# Patient Record
Sex: MALE | Race: WHITE | NOT HISPANIC OR LATINO | Employment: FULL TIME | URBAN - METROPOLITAN AREA
[De-identification: names, ages, dates, MRNs, and addresses within clinical notes are randomized per-mention and may not be internally consistent; named-entity substitution may affect disease eponyms.]

---

## 2021-09-21 ENCOUNTER — OFFICE VISIT (OUTPATIENT)
Dept: INTERNAL MEDICINE | Facility: CLINIC | Age: 26
End: 2021-09-21
Payer: COMMERCIAL

## 2021-09-21 VITALS
WEIGHT: 195 LBS | HEIGHT: 71 IN | SYSTOLIC BLOOD PRESSURE: 146 MMHG | OXYGEN SATURATION: 100 % | DIASTOLIC BLOOD PRESSURE: 68 MMHG | BODY MASS INDEX: 27.3 KG/M2 | HEART RATE: 68 BPM | TEMPERATURE: 97.2 F

## 2021-09-21 DIAGNOSIS — R01.1 HEART MURMUR: ICD-10-CM

## 2021-09-21 DIAGNOSIS — Z13.6 CARDIOVASCULAR SCREENING; LDL GOAL LESS THAN 130: ICD-10-CM

## 2021-09-21 DIAGNOSIS — R03.0 ELEVATED BLOOD PRESSURE READING WITHOUT DIAGNOSIS OF HYPERTENSION: ICD-10-CM

## 2021-09-21 DIAGNOSIS — Z00.00 ROUTINE GENERAL MEDICAL EXAMINATION AT A HEALTH CARE FACILITY: Primary | ICD-10-CM

## 2021-09-21 DIAGNOSIS — L65.9 HAIR LOSS: ICD-10-CM

## 2021-09-21 LAB
ANION GAP SERPL CALCULATED.3IONS-SCNC: 6 MMOL/L (ref 3–14)
BUN SERPL-MCNC: 16 MG/DL (ref 7–30)
CALCIUM SERPL-MCNC: 8.5 MG/DL (ref 8.5–10.1)
CHLORIDE BLD-SCNC: 105 MMOL/L (ref 94–109)
CHOLEST SERPL-MCNC: 180 MG/DL
CO2 SERPL-SCNC: 28 MMOL/L (ref 20–32)
CREAT SERPL-MCNC: 1.33 MG/DL (ref 0.66–1.25)
ERYTHROCYTE [DISTWIDTH] IN BLOOD BY AUTOMATED COUNT: 17.6 % (ref 10–15)
FASTING STATUS PATIENT QL REPORTED: YES
GFR SERPL CREATININE-BSD FRML MDRD: 74 ML/MIN/1.73M2
GLUCOSE BLD-MCNC: 101 MG/DL (ref 70–99)
HCT VFR BLD AUTO: 43.7 % (ref 40–53)
HDLC SERPL-MCNC: 42 MG/DL
HGB BLD-MCNC: 13.3 G/DL (ref 13.3–17.7)
LDLC SERPL CALC-MCNC: 107 MG/DL
MCH RBC QN AUTO: 19.4 PG (ref 26.5–33)
MCHC RBC AUTO-ENTMCNC: 30.4 G/DL (ref 31.5–36.5)
MCV RBC AUTO: 64 FL (ref 78–100)
NONHDLC SERPL-MCNC: 138 MG/DL
PLAT MORPH BLD: NORMAL
PLATELET # BLD AUTO: 178 10E3/UL (ref 150–450)
POTASSIUM BLD-SCNC: 3.8 MMOL/L (ref 3.4–5.3)
RBC # BLD AUTO: 6.84 10E6/UL (ref 4.4–5.9)
RBC MORPH BLD: NORMAL
SODIUM SERPL-SCNC: 139 MMOL/L (ref 133–144)
TRIGL SERPL-MCNC: 154 MG/DL
WBC # BLD AUTO: 5.7 10E3/UL (ref 4–11)

## 2021-09-21 PROCEDURE — 36415 COLL VENOUS BLD VENIPUNCTURE: CPT | Performed by: INTERNAL MEDICINE

## 2021-09-21 PROCEDURE — 99385 PREV VISIT NEW AGE 18-39: CPT | Performed by: INTERNAL MEDICINE

## 2021-09-21 PROCEDURE — 85027 COMPLETE CBC AUTOMATED: CPT | Performed by: INTERNAL MEDICINE

## 2021-09-21 PROCEDURE — 80061 LIPID PANEL: CPT | Performed by: INTERNAL MEDICINE

## 2021-09-21 PROCEDURE — 80048 BASIC METABOLIC PNL TOTAL CA: CPT | Performed by: INTERNAL MEDICINE

## 2021-09-21 RX ORDER — CETIRIZINE HYDROCHLORIDE 10 MG/1
10 TABLET ORAL DAILY
COMMUNITY

## 2021-09-21 RX ORDER — FINASTERIDE 5 MG/1
TABLET, FILM COATED ORAL
Qty: 30 TABLET | Refills: 2 | Status: SHIPPED | OUTPATIENT
Start: 2021-09-21 | End: 2022-10-12

## 2021-09-21 RX ORDER — FINASTERIDE 1 MG/1
1 TABLET, FILM COATED ORAL DAILY
COMMUNITY
End: 2021-09-21 | Stop reason: ALTCHOICE

## 2021-09-21 RX ORDER — FLUTICASONE PROPIONATE 50 MCG
1 SPRAY, SUSPENSION (ML) NASAL DAILY
COMMUNITY

## 2021-09-21 ASSESSMENT — ENCOUNTER SYMPTOMS
JOINT SWELLING: 0
EYE PAIN: 0
HEMATOCHEZIA: 0
NAUSEA: 0
HEMATURIA: 0
WEAKNESS: 0
DIZZINESS: 0
FEVER: 0
HEARTBURN: 0
SHORTNESS OF BREATH: 0
COUGH: 0
DIARRHEA: 0
SORE THROAT: 0
CONSTIPATION: 0
DYSURIA: 0
ARTHRALGIAS: 0
FREQUENCY: 0
HEADACHES: 0
NERVOUS/ANXIOUS: 0
CHILLS: 0
ABDOMINAL PAIN: 0
PARESTHESIAS: 0
MYALGIAS: 0
PALPITATIONS: 0

## 2021-09-21 ASSESSMENT — MIFFLIN-ST. JEOR: SCORE: 1883.7

## 2021-09-21 NOTE — PATIENT INSTRUCTIONS
"  *  Echocardiogram to evaluate the heart murmur at [facility]     *  I will provide recommendations based on the results      Preventive Health Recommendations  Male Ages 21 - 25     Yearly exam:             See your health care provider every year in order to  o   Review health changes.   o   Discuss preventive care.    o   Review your medicines if your doctor has prescribed any.    You should be tested each year for STDs (sexually transmitted diseases).     Talk to your provider about cholesterol testing.      If you are at risk for diabetes, you should have a diabetes test (fasting glucose).    Shots: Get a flu shot each year. Get a tetanus shot every 10 years.     Nutrition:    Eat at least 5 servings of fruits and vegetables daily.     Eat whole-grain bread, whole-wheat pasta and brown rice instead of white grains and rice.     Get adequate calcium and Vitamin D.        --Good Grains:  Oats, brown rice, Quinoa (these do not raise the blood sugar as much)     --Bad grains:  Anything made from wheat or white rice     (because these raise the blood sugars significantly, and the possible gluten issue from wheat for some people).      --Proteins:  Aim for \"lean proteins\" including chicken, fish, seafood, pork, turkey, and eggs (in moderation); Eat red meat only occasionally      Dexter Andrade:                      Lifestyle    Exercise for at least 150 minutes a week (30 minutes a day, 5 days a week). This will help you control your weight and prevent disease.     Limit alcohol to one drink per day.     No smoking.     Wear sunscreen to prevent skin cancer.     See your dentist every six months for an exam and cleaning.         SEASONAL ALLERGIES:     *  Take one of the following over the counter non-sedating anti-histamines allergy tablet once per day, every day for the next 4-8 weeks during the duration of the allergy season.      --generic Allegra (fexofenidine)  OR  --generic Claritin " "(loratidine)     OR  --generic Zyrtec (cetirizine)      *  IF YOU HAVE A LOT OF EYE IRRITATION FROM ALLERGIES:Use allergy eye drops IF NEEDED for allergic conjunctivitis     --Patanol (or similar over the counter product):  1 drop into the affected eye twice per day as needed during the main allergy season(s)     --if this prescription eye drop is not covered by your insurance, then have the pharmacist direct you to a similar over the counter version.          *  IF YOU HAVE A LOT OF NASAL OR SINUS CONGESTION:  Use steroid nasal spray (available over the counter), Use 2 sprays into each nostril once per day, every day regardless of how you feel for the next 4-8 weeks, depending on the length of the allergy season.  This type of steroid nasal spray will take at least 10 days to reach full effect, please use it for at least 3 full weeks before deciding if it doesn't work for you.       --typical brands include Flonase (fluticasone) or Nasonex (mometasone) or Nasocort (triamcinolone)    Examples of steroid nasal spray:  (cheapest prices are from [employer] or [employer])             * Beware of decongestants or medications preparations that have a \"D\", these contain pseudoephedrine or phenylephrine which may raise your blood pressure. If your blood pressure is well controlled and you are not on multiple medications, then you may be able to take small amounts of decongestant without a large chance of side effects, but please monitor your blood pressure and if >140/90 while on the decongestants, then stop those products.     *  If you cannot tolerate decongestants or have been told not to take decongestants, you can use chlortrimeton (chlorpheniramine) if you react poorly to decongestants.  Always try and use the lowest dose needed.  If you have a low of overnight or early morning allergy symptoms, then try taking you favorite nighttime multi symptom cold reliever medication (such as Nyquil, Vicks Formula 44, etc.)      "

## 2021-09-21 NOTE — PROGRESS NOTES
SUBJECTIVE:   CC: Dallin Tello is an 26 year old male who presents for preventative health visit.       Patient has been advised of split billing requirements and indicates understanding: Yes  Healthy Habits:     Getting at least 3 servings of Calcium per day:  Yes    Bi-annual eye exam:  NO    Dental care twice a year:  Yes    Sleep apnea or symptoms of sleep apnea:  None    Diet:  Regular (no restrictions)    Frequency of exercise:  4-5 days/week    Duration of exercise:  45-60 minutes    Taking medications regularly:  No    Barriers to taking medications:  None    Medication side effects:  None    PHQ-2 Total Score: 0    Additional concerns today:  Yes      1.  Taking low-dose finasteride for hair loss.  Reports no side effects.  He currently gets his low-dose.  Has tried prescription through online pharmacy, pain $59 every 3 months for the 1 mg size tablet.    2.  Intermittent mildly elevated blood pressures.  Has been monitoring his blood pressure closely at home with a home blood pressure cuff.  Has checked over 20 readings in the past several weeks average 132/71    Today's PHQ-2 Score:   PHQ-2 ( 1999 Pfizer) 9/21/2021   Q1: Little interest or pleasure in doing things 0   Q2: Feeling down, depressed or hopeless 0   PHQ-2 Score 0   Q1: Little interest or pleasure in doing things Not at all   Q2: Feeling down, depressed or hopeless Not at all   PHQ-2 Score 0       Abuse: Current or Past(Physical, Sexual or Emotional)- No  Do you feel safe in your environment? Yes    Have you ever done Advance Care Planning? (For example, a Health Directive, POLST, or a discussion with a medical provider or your loved ones about your wishes): No, advance care planning information given to patient to review.  Patient declined advance care planning discussion at this time.    Social History     Tobacco Use     Smoking status: Never Smoker     Smokeless tobacco: Never Used   Substance Use Topics     Alcohol use: Yes      Comment: 1x week         Alcohol Use 9/21/2021   Prescreen: >3 drinks/day or >7 drinks/week? No       Last PSA: No results found for: PSA    Reviewed orders with patient. Reviewed health maintenance and updated orders accordingly - Yes      Reviewed and updated as needed this visit by clinical staff  Tobacco  Allergies  Meds   Med Hx  Surg Hx  Fam Hx  Soc Hx        Reviewed and updated as needed this visit by Provider  Tobacco  Allergies    Med Hx  Surg Hx  Fam Hx           **I reviewed the information recorded in the patient's EPIC chart (including but not limited to medical history, surgical history, family history, problem list, medication list, and allergy list) and updated the information as indicated based on the patients reported information.         Review of Systems   Constitutional: Negative for chills and fever.   HENT: Negative for congestion, ear pain, hearing loss and sore throat.    Eyes: Negative for pain and visual disturbance.   Respiratory: Negative for cough and shortness of breath.    Cardiovascular: Negative for chest pain, palpitations and peripheral edema.   Gastrointestinal: Negative for abdominal pain, constipation, diarrhea, heartburn, hematochezia and nausea.   Genitourinary: Negative for dysuria, frequency, genital sores, hematuria and urgency.   Musculoskeletal: Negative for arthralgias, joint swelling and myalgias.   Skin: Negative for rash.   Neurological: Negative for dizziness, weakness, headaches and paresthesias.   Psychiatric/Behavioral: Negative for mood changes. The patient is not nervous/anxious.      CONSTITUTIONAL: NEGATIVE for fever, chills, change in weight  INTEGUMENTARY/SKIN: NEGATIVE for worrisome rashes, moles or lesions  EYES: NEGATIVE for vision changes or irritation  ENT: NEGATIVE for ear, mouth and throat problems  RESP: NEGATIVE for significant cough or SOB  CV: NEGATIVE for chest pain, palpitations or peripheral edema  GI: NEGATIVE for nausea,  "abdominal pain, heartburn, or change in bowel habits   male: negative for dysuria, hematuria, decreased urinary stream, erectile dysfunction, urethral discharge  MUSCULOSKELETAL: NEGATIVE for significant arthralgias or myalgia  NEURO: NEGATIVE for weakness, dizziness or paresthesias  PSYCHIATRIC: NEGATIVE for changes in mood or affect    OBJECTIVE:   BP (!) 146/68   Pulse 68   Temp 97.2  F (36.2  C) (Tympanic)   Ht 1.791 m (5' 10.5\")   Wt 88.5 kg (195 lb)   SpO2 100%   BMI 27.58 kg/m      Physical Exam  GENERAL alert and no distress  EYES:  Normal sclera,conjunctiva, EOMI  HENT: oral and posterior pharynx without lesions or erythema, facies symmetric  NECK: Neck supple. No LAD, without thyroidmegaly.  RESP: Clear to ausculation bilaterally without wheezes or crackles. Normal BS in all fields.  CV: RRR normal S1S2, 2/6 systolic murmur in upper left sternal border; without rubs or gallops.  LYMPH: no cervical lymph adenopathy appreciated  MS: extremities- no gross deformities of the visible extremities noted,   EXT:  no lower extremity edema  PSYCH: Alert and oriented times 3; speech- coherent  SKIN:  No obvious significant skin lesions on visible portions of face     Diagnostic Test Results:  Labs reviewed in Epic    ASSESSMENT/PLAN:     (Z00.00) Routine general medical examination at a health care facility  (primary encounter diagnosis)  Comment: Discussed cardiac disease risk factor modification including screening, preventing, and treating hypertension, elevated lipids, diabetes, and smoking cessation.    Discussed age appropriate cancer screening recommendations as dictated by age group and past medical history.    Recommended making better food choices as often as possible, including lower carb, lower fat, lower salt diet and moderation in any alcohol intake.    Recommended maintaining regular physical activity/exercise throughout their lifetime.  Recommended safety and injury prevention (i.e. seatbelt " "use, safety equipment like helmets when biking, etc).       Plan:     (L65.9) Hair loss  Comment: This condition is currently controlled on the current medical regimen.  Continue current therapy.   He has not experienced any significant side effects of this medication.   Plan: finasteride (PROSCAR) 5 MG tablet            (Z13.6) CARDIOVASCULAR SCREENING; LDL GOAL LESS THAN 130  Comment: Discussed cardiac disease risk factors and cardiac disease risk factor modification.   Plan: Lipid panel reflex to direct LDL Fasting, CBC         with platelets, Basic metabolic panel            (R01.1) Heart murmur  Comment: Heart murmur louder than expected.  Reports a history of a heart murmur, with a prior echocardiogram performed in 2016.  I have no records of this, the patient reports that it was deemed \"normal\".  Given the borderline elevated blood pressure, need another echocardiogram to make sure he does not have some sort of cardiomyopathy or aortic stenosis.    Plan: Echocardiogram Complete            (R03.0) Elevated blood pressure reading without diagnosis of hypertension  Comment: Minimally elevated blood pressure today in the clinic, blood pressures at home been consistently 132/71 average.  Continue to monitor blood pressure, continue lifestyle modification clear low-sodium diet regular activity weight weight modification.  Plan:        Patient has been advised of split billing requirements and indicates understanding: Yes  COUNSELING:   Reviewed preventive health counseling, as reflected in patient instructions       Regular exercise       Healthy diet/nutrition       Vision screening       Hearing screening       Immunizations    up to date including Covid        Estimated body mass index is 27.58 kg/m  as calculated from the following:    Height as of this encounter: 1.791 m (5' 10.5\").    Weight as of this encounter: 88.5 kg (195 lb).         He reports that he has never smoked. He has never used smokeless " tobacco.      Counseling Resources:  ATP IV Guidelines  Pooled Cohorts Equation Calculator  FRAX Risk Assessment  ICSI Preventive Guidelines  Dietary Guidelines for Americans, 2010  USDA's MyPlate  ASA Prophylaxis  Lung CA Screening    Fabien Cruz MD  [facility]

## 2021-10-17 ENCOUNTER — HEALTH MAINTENANCE LETTER (OUTPATIENT)
Age: 26
End: 2021-10-17

## 2022-04-26 ENCOUNTER — HOSPITAL ENCOUNTER (OUTPATIENT)
Dept: CARDIOLOGY | Facility: CLINIC | Age: 27
Discharge: HOME | End: 2022-04-26
Attending: INTERNAL MEDICINE | Admitting: INTERNAL MEDICINE
Payer: COMMERCIAL

## 2022-04-26 DIAGNOSIS — R01.1 HEART MURMUR: ICD-10-CM

## 2022-04-26 LAB — LVEF ECHO: NORMAL

## 2022-04-26 PROCEDURE — 93306 TTE W/DOPPLER COMPLETE: CPT | Mod: 26 | Performed by: INTERNAL MEDICINE

## 2022-04-26 PROCEDURE — 93306 TTE W/DOPPLER COMPLETE: CPT

## 2022-05-09 ENCOUNTER — MYC MEDICAL ADVICE (OUTPATIENT)
Dept: INTERNAL MEDICINE | Facility: CLINIC | Age: 27
End: 2022-05-09
Payer: COMMERCIAL

## 2022-05-09 NOTE — TELEPHONE ENCOUNTER
PCP, please read the heart echo. Patient calling for results.     Read results to patient , just need your final review.     Jeanie De Jesus RN  [facility]

## 2022-10-11 DIAGNOSIS — L65.9 HAIR LOSS: ICD-10-CM

## 2022-10-12 RX ORDER — FINASTERIDE 5 MG/1
TABLET, FILM COATED ORAL
Qty: 25 TABLET | Refills: 0 | Status: SHIPPED | OUTPATIENT
Start: 2022-10-12 | End: 2022-10-13

## 2022-10-12 NOTE — TELEPHONE ENCOUNTER
"Received a message stating \"transmission to pharmacy failed.\" Medication re-pended and routing to refill pool for review.     Shanika Hood RN    "

## 2022-10-13 DIAGNOSIS — L65.9 HAIR LOSS: ICD-10-CM

## 2022-10-13 RX ORDER — FINASTERIDE 5 MG/1
TABLET, FILM COATED ORAL
Qty: 25 TABLET | Refills: 0 | Status: SHIPPED | OUTPATIENT
Start: 2022-10-13 | End: 2023-01-09

## 2022-10-13 NOTE — TELEPHONE ENCOUNTER
Prescription approved per [facility] Refill Protocol.  Shanika Hood, RN  [facility] Triage Nurse

## 2022-10-17 RX ORDER — FINASTERIDE 5 MG/1
TABLET, FILM COATED ORAL
Qty: 23 TABLET | Refills: 2 | OUTPATIENT
Start: 2022-10-17

## 2022-10-17 NOTE — TELEPHONE ENCOUNTER
This refill request is a duplicate previously sent to pharmacy or currently in review.  Refused medication to pharmacy as duplicate.   Yaritza Crowe RN

## 2023-01-04 DIAGNOSIS — L65.9 HAIR LOSS: ICD-10-CM

## 2023-01-09 RX ORDER — FINASTERIDE 5 MG/1
TABLET, FILM COATED ORAL
Qty: 23 TABLET | Refills: 1 | Status: SHIPPED | OUTPATIENT
Start: 2023-01-09 | End: 2023-05-05

## 2023-02-11 ENCOUNTER — HEALTH MAINTENANCE LETTER (OUTPATIENT)
Age: 28
End: 2023-02-11

## 2023-05-05 ENCOUNTER — OFFICE VISIT (OUTPATIENT)
Dept: FAMILY MEDICINE | Facility: CLINIC | Age: 28
End: 2023-05-05
Payer: COMMERCIAL

## 2023-05-05 VITALS
WEIGHT: 198 LBS | HEIGHT: 70 IN | SYSTOLIC BLOOD PRESSURE: 157 MMHG | OXYGEN SATURATION: 99 % | RESPIRATION RATE: 14 BRPM | BODY MASS INDEX: 28.35 KG/M2 | DIASTOLIC BLOOD PRESSURE: 77 MMHG | HEART RATE: 63 BPM | TEMPERATURE: 97.6 F

## 2023-05-05 DIAGNOSIS — Z11.59 NEED FOR HEPATITIS C SCREENING TEST: ICD-10-CM

## 2023-05-05 DIAGNOSIS — Z11.4 SCREENING FOR HIV (HUMAN IMMUNODEFICIENCY VIRUS): ICD-10-CM

## 2023-05-05 DIAGNOSIS — Z13.1 SCREENING FOR DIABETES MELLITUS: ICD-10-CM

## 2023-05-05 DIAGNOSIS — Z00.00 ANNUAL PHYSICAL EXAM: Primary | ICD-10-CM

## 2023-05-05 DIAGNOSIS — L65.9 HAIR LOSS: ICD-10-CM

## 2023-05-05 DIAGNOSIS — Z13.29 SCREENING FOR THYROID DISORDER: ICD-10-CM

## 2023-05-05 DIAGNOSIS — R01.1 HEART MURMUR: ICD-10-CM

## 2023-05-05 DIAGNOSIS — I10 PRIMARY HYPERTENSION: ICD-10-CM

## 2023-05-05 LAB
ALBUMIN UR-MCNC: NEGATIVE MG/DL
ANION GAP SERPL CALCULATED.3IONS-SCNC: 14 MMOL/L (ref 7–15)
APPEARANCE UR: CLEAR
BILIRUB UR QL STRIP: NEGATIVE
BUN SERPL-MCNC: 16.7 MG/DL (ref 6–20)
CALCIUM SERPL-MCNC: 9.7 MG/DL (ref 8.6–10)
CHLORIDE SERPL-SCNC: 100 MMOL/L (ref 98–107)
COLOR UR AUTO: YELLOW
CREAT SERPL-MCNC: 1.1 MG/DL (ref 0.67–1.17)
DEPRECATED HCO3 PLAS-SCNC: 26 MMOL/L (ref 22–29)
GFR SERPL CREATININE-BSD FRML MDRD: >90 ML/MIN/1.73M2
GLUCOSE SERPL-MCNC: 105 MG/DL (ref 70–99)
GLUCOSE UR STRIP-MCNC: NEGATIVE MG/DL
HGB UR QL STRIP: NEGATIVE
KETONES UR STRIP-MCNC: NEGATIVE MG/DL
LEUKOCYTE ESTERASE UR QL STRIP: NEGATIVE
NITRATE UR QL: NEGATIVE
PH UR STRIP: 6.5 [PH] (ref 5–7)
POTASSIUM SERPL-SCNC: 4 MMOL/L (ref 3.4–5.3)
SODIUM SERPL-SCNC: 140 MMOL/L (ref 136–145)
SP GR UR STRIP: <=1.005 (ref 1–1.03)
TSH SERPL DL<=0.005 MIU/L-ACNC: 0.93 UIU/ML (ref 0.3–4.2)
UROBILINOGEN UR STRIP-ACNC: 0.2 E.U./DL

## 2023-05-05 PROCEDURE — 86803 HEPATITIS C AB TEST: CPT | Performed by: FAMILY MEDICINE

## 2023-05-05 PROCEDURE — 84443 ASSAY THYROID STIM HORMONE: CPT | Performed by: FAMILY MEDICINE

## 2023-05-05 PROCEDURE — 99395 PREV VISIT EST AGE 18-39: CPT | Performed by: FAMILY MEDICINE

## 2023-05-05 PROCEDURE — 81003 URINALYSIS AUTO W/O SCOPE: CPT | Performed by: FAMILY MEDICINE

## 2023-05-05 PROCEDURE — 87389 HIV-1 AG W/HIV-1&-2 AB AG IA: CPT | Performed by: FAMILY MEDICINE

## 2023-05-05 PROCEDURE — 99213 OFFICE O/P EST LOW 20 MIN: CPT | Mod: 25 | Performed by: FAMILY MEDICINE

## 2023-05-05 PROCEDURE — 80048 BASIC METABOLIC PNL TOTAL CA: CPT | Performed by: FAMILY MEDICINE

## 2023-05-05 PROCEDURE — 36415 COLL VENOUS BLD VENIPUNCTURE: CPT | Performed by: FAMILY MEDICINE

## 2023-05-05 RX ORDER — FINASTERIDE 5 MG/1
TABLET, FILM COATED ORAL
Qty: 30 TABLET | Refills: 3 | Status: SHIPPED | OUTPATIENT
Start: 2023-05-05 | End: 2024-05-30

## 2023-05-05 ASSESSMENT — ENCOUNTER SYMPTOMS
DIZZINESS: 0
PARESTHESIAS: 0
NAUSEA: 0
WEAKNESS: 0
FEVER: 0
CHILLS: 0
HEMATURIA: 0
HEADACHES: 0
HEARTBURN: 0
JOINT SWELLING: 0
ABDOMINAL PAIN: 0
COUGH: 0
DYSURIA: 0
ARTHRALGIAS: 0
EYE PAIN: 0
SHORTNESS OF BREATH: 0
HEMATOCHEZIA: 0
PALPITATIONS: 0
NERVOUS/ANXIOUS: 0
MYALGIAS: 0
SORE THROAT: 0
DIARRHEA: 0
FREQUENCY: 0
CONSTIPATION: 0

## 2023-05-05 ASSESSMENT — PAIN SCALES - GENERAL: PAINLEVEL: NO PAIN (0)

## 2023-05-05 NOTE — PROGRESS NOTES
"Assessment/Plan    Preventive.  See below orders for screening tests and immunizations. NOT FASTING.  -pt will check to see if he received HPV shot as child  -he declines COVID booster  -he denies STI concerns    HTN. BPs near goal (<140/90).  -given age, will check labs to assess for secondary causes  -reduce caffeine intake, especially from pre-workout supplement  -discussed option of starting BP med. Will hold off for now    F/u annually.    Orders Placed This Encounter   Procedures     REVIEW OF HEALTH MAINTENANCE PROTOCOL ORDERS     HIV Antigen Antibody Combo     Hepatitis C Screen Reflex to HCV RNA Quant and Genotype     TSH with free T4 reflex     Basic metabolic panel  (Ca, Cl, CO2, Creat, Gluc, K, Na, BUN)     UA reflex to Microscopic - lab collect     ----  Chief complaint: Physical (Blood pressure has always been kind of on the \"high end\") and Establish Care    Social History     Social History Narrative    Updated 5/6/2023: Grew up in [place]. Lives with jostin (female). Has dog. No kids. Works in [employer].     HTN. Diagnosed as adult. Per home monitor, 90d average 140/57. Usually lower in morning. Mom and dad have HTN. Denies recent significant weight gain. Has never taken BP med. Exercising more since 12/2022. Pre-workout supplement contains caffeine.    Heart murmur. Longstanding. Had reassuring echo. Maternal grandfather possibly had valve disease. Denies recent dizziness w/ exercise.    Exam  BP (!) 157/77 (BP Location: Right arm, Patient Position: Sitting, Cuff Size: Adult Regular)   Pulse 63   Temp 97.6  F (36.4  C) (Temporal)   Resp 14   Ht 1.775 m (5' 9.88\")   Wt 89.8 kg (198 lb)   SpO2 99%   BMI 28.51 kg/m      oropharynx without abnormal masses  no cervical adenopathy  lung fields CTAB  heart with regular rate and rhythm, subtle systolic murmur at sternal border  no lower leg edema  "

## 2023-05-05 NOTE — PATIENT INSTRUCTIONS
Blood pressure is borderline-high. Ideally, numbers would stay below 130/85. When folks get above 140/90, we start to think about medication. Cut down on caffeine. Keep exercising. Watch your salt intake.    Blood tests today to look for other conditions that could cause high blood pressure.    Think about COVID booster.    Let me know if you have NOT received the HPV vaccine. Usually given around age 11.

## 2023-05-06 PROBLEM — L65.9 HAIR LOSS: Status: ACTIVE | Noted: 2023-05-06

## 2023-05-06 PROBLEM — I10 PRIMARY HYPERTENSION: Status: ACTIVE | Noted: 2023-05-06

## 2023-05-06 PROBLEM — R01.1 HEART MURMUR: Status: ACTIVE | Noted: 2023-05-06

## 2023-05-08 LAB
HCV AB SERPL QL IA: NONREACTIVE
HIV 1+2 AB+HIV1 P24 AG SERPL QL IA: NONREACTIVE

## 2024-04-05 ENCOUNTER — PATIENT OUTREACH (OUTPATIENT)
Dept: CARE COORDINATION | Facility: CLINIC | Age: 29
End: 2024-04-05
Payer: COMMERCIAL

## 2024-04-19 ENCOUNTER — PATIENT OUTREACH (OUTPATIENT)
Dept: CARE COORDINATION | Facility: CLINIC | Age: 29
End: 2024-04-19
Payer: COMMERCIAL

## 2024-05-30 ENCOUNTER — MYC REFILL (OUTPATIENT)
Dept: FAMILY MEDICINE | Facility: CLINIC | Age: 29
End: 2024-05-30
Payer: COMMERCIAL

## 2024-05-30 DIAGNOSIS — L65.9 HAIR LOSS: ICD-10-CM

## 2024-05-30 RX ORDER — FINASTERIDE 5 MG/1
TABLET, FILM COATED ORAL
Qty: 30 TABLET | Refills: 3 | Status: SHIPPED | OUTPATIENT
Start: 2024-05-30

## 2024-07-16 ENCOUNTER — OFFICE VISIT (OUTPATIENT)
Dept: FAMILY MEDICINE | Facility: CLINIC | Age: 29
End: 2024-07-16
Payer: COMMERCIAL

## 2024-07-16 VITALS
BODY MASS INDEX: 27.09 KG/M2 | TEMPERATURE: 97.8 F | RESPIRATION RATE: 18 BRPM | HEIGHT: 72 IN | HEART RATE: 74 BPM | WEIGHT: 200 LBS | DIASTOLIC BLOOD PRESSURE: 76 MMHG | OXYGEN SATURATION: 100 % | SYSTOLIC BLOOD PRESSURE: 138 MMHG

## 2024-07-16 DIAGNOSIS — Z01.84 IMMUNITY STATUS TESTING: ICD-10-CM

## 2024-07-16 DIAGNOSIS — Z28.21 VACCINATION NOT CARRIED OUT BECAUSE OF PATIENT REFUSAL: ICD-10-CM

## 2024-07-16 DIAGNOSIS — Z00.00 ROUTINE GENERAL MEDICAL EXAMINATION AT A HEALTH CARE FACILITY: Primary | ICD-10-CM

## 2024-07-16 DIAGNOSIS — R71.8 DECREASED MEAN CORPUSCULAR VOLUME: ICD-10-CM

## 2024-07-16 DIAGNOSIS — Z13.220 SCREENING FOR LIPID DISORDERS: ICD-10-CM
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
## 2024-07-16 DIAGNOSIS — J02.9 ACUTE PHARYNGITIS, UNSPECIFIED ETIOLOGY: ICD-10-CM

## 2024-07-16 DIAGNOSIS — Z23 NEED FOR VACCINATION: ICD-10-CM

## 2024-07-16 DIAGNOSIS — Z71.84 TRAVEL ADVICE ENCOUNTER: ICD-10-CM

## 2024-07-16 DIAGNOSIS — Z86.39 HISTORY OF ELEVATED GLUCOSE: ICD-10-CM

## 2024-07-16 DIAGNOSIS — I10 ESSENTIAL HYPERTENSION WITH GOAL BLOOD PRESSURE LESS THAN 140/90: ICD-10-CM

## 2024-07-16 PROBLEM — E66.3 OVERWEIGHT (BMI 25.0-29.9): Status: ACTIVE | Noted: 2024-07-16

## 2024-07-16 LAB
ANION GAP SERPL CALCULATED.3IONS-SCNC: 11 MMOL/L (ref 7–15)
BUN SERPL-MCNC: 19.6 MG/DL (ref 6–20)
CALCIUM SERPL-MCNC: 9.2 MG/DL (ref 8.8–10.4)
CHLORIDE SERPL-SCNC: 102 MMOL/L (ref 98–107)
CHOLEST SERPL-MCNC: 162 MG/DL
CREAT SERPL-MCNC: 1.13 MG/DL (ref 0.67–1.17)
DEPRECATED S PYO AG THROAT QL EIA: NEGATIVE
EGFRCR SERPLBLD CKD-EPI 2021: >90 ML/MIN/1.73M2
FASTING STATUS PATIENT QL REPORTED: YES
FASTING STATUS PATIENT QL REPORTED: YES
FERRITIN SERPL-MCNC: 297 NG/ML (ref 31–409)
FOLATE SERPL-MCNC: 7.1 NG/ML (ref 4.6–34.8)
GLUCOSE SERPL-MCNC: 92 MG/DL (ref 70–99)
GROUP A STREP BY PCR: NOT DETECTED
HBA1C MFR BLD: 5.2 % (ref 0–5.6)
HBV SURFACE AB SERPL IA-ACNC: <3.5 M[IU]/ML
HBV SURFACE AB SERPL IA-ACNC: NONREACTIVE M[IU]/ML
HCO3 SERPL-SCNC: 26 MMOL/L (ref 22–29)
HDLC SERPL-MCNC: 33 MG/DL
IRON BINDING CAPACITY (ROCHE): 272 UG/DL (ref 240–430)
IRON SATN MFR SERPL: 46 % (ref 15–46)
IRON SERPL-MCNC: 124 UG/DL (ref 61–157)
LDLC SERPL CALC-MCNC: 101 MG/DL
NONHDLC SERPL-MCNC: 129 MG/DL
POTASSIUM SERPL-SCNC: 4 MMOL/L (ref 3.4–5.3)
SARS-COV-2 RNA RESP QL NAA+PROBE: NEGATIVE
SODIUM SERPL-SCNC: 139 MMOL/L (ref 135–145)
TRIGL SERPL-MCNC: 142 MG/DL
VIT B12 SERPL-MCNC: 330 PG/ML (ref 232–1245)

## 2024-07-16 PROCEDURE — 83036 HEMOGLOBIN GLYCOSYLATED A1C: CPT | Performed by: FAMILY MEDICINE

## 2024-07-16 PROCEDURE — 80061 LIPID PANEL: CPT | Performed by: FAMILY MEDICINE

## 2024-07-16 PROCEDURE — 82746 ASSAY OF FOLIC ACID SERUM: CPT | Performed by: FAMILY MEDICINE

## 2024-07-16 PROCEDURE — 87635 SARS-COV-2 COVID-19 AMP PRB: CPT | Performed by: FAMILY MEDICINE

## 2024-07-16 PROCEDURE — 85027 COMPLETE CBC AUTOMATED: CPT | Performed by: FAMILY MEDICINE

## 2024-07-16 PROCEDURE — 82607 VITAMIN B-12: CPT | Performed by: FAMILY MEDICINE

## 2024-07-16 PROCEDURE — 83550 IRON BINDING TEST: CPT | Performed by: FAMILY MEDICINE

## 2024-07-16 PROCEDURE — 80048 BASIC METABOLIC PNL TOTAL CA: CPT | Performed by: FAMILY MEDICINE

## 2024-07-16 PROCEDURE — 99214 OFFICE O/P EST MOD 30 MIN: CPT | Mod: 25 | Performed by: FAMILY MEDICINE

## 2024-07-16 PROCEDURE — 86706 HEP B SURFACE ANTIBODY: CPT | Performed by: FAMILY MEDICINE

## 2024-07-16 PROCEDURE — 82728 ASSAY OF FERRITIN: CPT | Performed by: FAMILY MEDICINE

## 2024-07-16 PROCEDURE — 99395 PREV VISIT EST AGE 18-39: CPT | Mod: 25 | Performed by: FAMILY MEDICINE

## 2024-07-16 PROCEDURE — 90632 HEPA VACCINE ADULT IM: CPT | Performed by: FAMILY MEDICINE

## 2024-07-16 PROCEDURE — 87651 STREP A DNA AMP PROBE: CPT | Performed by: FAMILY MEDICINE

## 2024-07-16 PROCEDURE — 90471 IMMUNIZATION ADMIN: CPT | Performed by: FAMILY MEDICINE

## 2024-07-16 PROCEDURE — 90746 HEPB VACCINE 3 DOSE ADULT IM: CPT | Performed by: FAMILY MEDICINE

## 2024-07-16 PROCEDURE — 36415 COLL VENOUS BLD VENIPUNCTURE: CPT | Performed by: FAMILY MEDICINE

## 2024-07-16 PROCEDURE — 90472 IMMUNIZATION ADMIN EACH ADD: CPT | Performed by: FAMILY MEDICINE

## 2024-07-16 PROCEDURE — 83540 ASSAY OF IRON: CPT | Performed by: FAMILY MEDICINE

## 2024-07-16 RX ORDER — ACETAZOLAMIDE 125 MG/1
125 TABLET ORAL EVERY 12 HOURS
Qty: 20 TABLET | Refills: 0 | Status: SHIPPED | OUTPATIENT
Start: 2024-07-16

## 2024-07-16 SDOH — HEALTH STABILITY: PHYSICAL HEALTH: ON AVERAGE, HOW MANY DAYS PER WEEK DO YOU ENGAGE IN MODERATE TO STRENUOUS EXERCISE (LIKE A BRISK WALK)?: 5 DAYS

## 2024-07-16 ASSESSMENT — SOCIAL DETERMINANTS OF HEALTH (SDOH): HOW OFTEN DO YOU GET TOGETHER WITH FRIENDS OR RELATIVES?: TWICE A WEEK

## 2024-07-16 NOTE — PROGRESS NOTES
Preventive Care Visit  [facility]  Jair Silva MD, Family Medicine  Jul 16, 2024      Assessment & Plan     (Z00.00) Routine general medical examination at a health care facility  (primary encounter diagnosis)  Comment: Negative screening exam; up-to-date on preventive services.   Plan: Lipid panel reflex to direct LDL Non-fasting,         HEPATITIS A 19+ (HAVRIX/VAQTA), HEPATITIS B,         ADULT 20+ (ENGERIX-B/RECOMBIVAX HB)        Return in about 1 year (around 7/16/2025) for full physical, blood pressure check.      (I10) Essential hypertension with goal blood pressure less than 140/90  Comment: normal BP today. No medication for now.  Plan: Basic metabolic panel, CBC with platelets        Return in about 1 year (around 7/16/2025) for full physical, blood pressure check.      (Z86.39) History of elevated glucose  Comment: Mild.   Plan: Hemoglobin A1c            (R71.8) Decreased mean corpuscular volume  Comment: Unknown significance.   Plan: CBC with platelets, Iron & Iron Binding         Capacity, Vitamin B12, Folate, Ferritin        Follow up as needed.     (J02.9) Acute pharyngitis, unspecified etiology  Comment: Mild with normal exam.   Plan: Symptomatic COVID-19 Virus (Coronavirus) by PCR        Nose, Streptococcus A Rapid Screen w/Reflex to         PCR - Clinic Collect, Group A Streptococcus PCR        Throat Swab            (Z71.84) Travel advice encounter  (W94.11XA) Prolonged exposure to high altitude, initial encounter  Comment: [place] directly to [facility].   Plan: acetaZOLAMIDE (DIAMOX) 125 MG tablet            (Z01.84) Immunity status testing  Comment:   Plan: Hepatitis B Surface Antibody            (Z13.220) Screening for lipid disorders  Comment:   Plan: Lipid panel reflex to direct LDL Non-fasting            (Z23) Need for vaccination  Comment:   Plan: HEPATITIS A 19+ (HAVRIX/VAQTA), HEPATITIS B,         ADULT 20+  "(ENGERIX-B/RECOMBIVAX HB)            (Z28.21) Vaccination not carried out because of patient refusal  Comment: COVID-19   Plan:             BMI  Estimated body mass index is 27.51 kg/m  as calculated from the following:    Height as of this encounter: 1.816 m (5' 11.5\").    Weight as of this encounter: 90.7 kg (200 lb).   Weight management plan: exercise recommendations summarized in the AVS.     Patient reports he exercises 4 days a week. He does cardio and weight lifting exercises in addition to regular walking.     Counseling  Appropriate preventive services were discussed with this patient, including applicable screening as appropriate for fall prevention, nutrition, physical activity, Tobacco-use cessation, weight loss and cognition.  Checklist reviewing preventive services available has been given to the patient.  Reviewed patient's diet, addressing concerns and/or questions.         Estefania Valadez is a 29 year old, presenting for the following:  Physical, Hypertension, and Heart Problem (Heart Murmur)        7/16/2024     9:26 AM   Additional Questions   Roomed by Josué REA        Health Care Directive  Patient does not have a Health Care Directive or Living Will: Discussed advance care planning with patient; information given to patient to review.    HPI    Hypertension Follow-up    Do you check your blood pressure regularly outside of the clinic? Yes, reports last week's home BP readings have been in the 130s over 60s.   Are you following a low salt diet? Yes  Are your blood pressures ever more than 140 on the top number (systolic) OR more   than 90 on the bottom number (diastolic), for example 140/90? No        7/16/2024   General Health   How would you rate your overall physical health? Good   Feel stress (tense, anxious, or unable to sleep) Not at all            7/16/2024   Nutrition   Three or more servings of calcium each day? Yes   Diet: Regular (no restrictions)   How many servings of fruit and " vegetables per day? (!) 2-3   How many sweetened beverages each day? 0-1            7/16/2024   Exercise   Days per week of moderate/strenous exercise 5 days            7/16/2024   Social Factors   Frequency of gathering with friends or relatives Twice a week   Worry food won't last until get money to buy more No   Food not last or not have enough money for food? No   Do you have housing? (Housing is defined as stable permanent housing and does not include staying ouside in a car, in a tent, in an abandoned building, in an overnight shelter, or couch-surfing.) Yes   Are you worried about losing your housing? No   Lack of transportation? No   Unable to get utilities (heat,electricity)? No            7/16/2024   Dental   Dentist two times every year? Yes            7/16/2024   TB Screening   Were you born outside of the [place]? No            Today's PHQ-2 Score:       7/16/2024     9:16 AM   PHQ-2 ( 1999 Pfizer)   Q1: Little interest or pleasure in doing things 0   Q2: Feeling down, depressed or hopeless 0   PHQ-2 Score 0   Q1: Little interest or pleasure in doing things Not at all   Q2: Feeling down, depressed or hopeless Not at all   PHQ-2 Score 0           7/16/2024   Substance Use   Alcohol more than 3/day or more than 7/wk No   Do you use any other substances recreationally? No        Social History     Tobacco Use    Smoking status: Never    Smokeless tobacco: Never   Vaping Use    Vaping status: Never Used   Substance Use Topics    Alcohol use: Yes     Alcohol/week: 6.0 - 7.0 standard drinks of alcohol     Types: 6 - 7 Standard drinks or equivalent per week    Drug use: Never           7/16/2024   STI Screening   New sexual partner(s) since last STI/HIV test? No            7/16/2024   Contraception/Family Planning   Questions about contraception or family planning No             Past medical, family, and social histories, medications, and allergies are reviewed and updated in "[employer].     Review of Systems     Patient  "reports mild sore throat which he states he first noticed Sunday (7/14). He went to a wedding this past weekend and suspects he might have COVID-19.      Objective    Exam  /76 (BP Location: Right arm, Patient Position: Chair, Cuff Size: Adult Regular)   Pulse 74   Temp 97.8  F (36.6  C) (Temporal)   Resp 18   Ht 1.816 m (5' 11.5\")   Wt 90.7 kg (200 lb)   SpO2 100%   BMI 27.51 kg/m     Estimated body mass index is 27.51 kg/m  as calculated from the following:    Height as of this encounter: 1.816 m (5' 11.5\").    Weight as of this encounter: 90.7 kg (200 lb).    Physical Exam  GENERAL: alert and no distress  EYES: Eyes grossly normal to inspection, PERRL and conjunctivae and sclerae normal  HENT: ear canals and TM's normal, nose and mouth without ulcers or lesions  NECK: no adenopathy, no asymmetry, masses, or scars  RESP: lungs clear to auscultation - no rales, rhonchi or wheezes  CV: regular rate and rhythm, normal S1 S2, no S3 or S4, 2/6 systolic murmur. No click or rub, no peripheral edema.   ABDOMEN: soft, nontender, no hepatosplenomegaly, no masses and bowel sounds normal   (male): normal male genitalia without lesions or urethral discharge, no hernia  MS: no gross musculoskeletal defects noted, no edema  SKIN: no suspicious lesions or rashes  NEURO: Normal strength and tone, mentation intact and speech normal  PSYCH: mentation appears normal, affect normal/bright        Signed Electronically by: Jair Silva MD      The information in this document, created by the medical scribe for me, accurately reflects the services I personally performed and the decisions made by me. I have reviewed and approved this document for accuracy prior to signature.  Michelle Barnes"

## 2024-07-16 NOTE — NURSING NOTE
Prior to immunization administration, verified patients identity using patient s name and date of birth. Please see Immunization Activity for additional information.     Screening Questionnaire for Adult Immunization    Are you sick today?   No   Do you have allergies to medications, food, a vaccine component or latex?   No   Have you ever had a serious reaction after receiving a vaccination?   No   Do you have a long-term health problem with heart, lung, kidney, or metabolic disease (e.g., diabetes), asthma, a blood disorder, no spleen, complement component deficiency, a cochlear implant, or a spinal fluid leak?  Are you on long-term aspirin therapy?   No   Do you have cancer, leukemia, HIV/AIDS, or any other immune system problem?   No   Do you have a parent, brother, or sister with an immune system problem?   No   In the past 3 months, have you taken medications that affect  your immune system, such as prednisone, other steroids, or anticancer drugs; drugs for the treatment of rheumatoid arthritis, Crohn s disease, or psoriasis; or have you had radiation treatments?   No   Have you had a seizure, or a brain or other nervous system problem?   No   During the past year, have you received a transfusion of blood or blood    products, or been given immune (gamma) globulin or antiviral drug?   No   For women: Are you pregnant or is there a chance you could become       pregnant during the next month?   No   Have you received any vaccinations in the past 4 weeks?   No     Immunization questionnaire answers were all negative.      Patient instructed to remain in clinic for 15 minutes afterwards, and to report any adverse reactions.     Screening performed by Vane Ngo MA on 7/16/2024 at 10:40 AM.

## 2024-07-16 NOTE — PATIENT INSTRUCTIONS
Patient Education   Preventive Care Advice   This is general advice given by our system to help you stay healthy. However, your care team may have specific advice just for you. Please talk to your care team about your preventive care needs.  Nutrition  Eat 5 or more servings of fruits and vegetables each day.  Try wheat bread, brown rice and whole grain pasta (instead of white bread, rice, and pasta).  Get enough calcium and vitamin D. Check the label on foods and aim for 100% of the RDA (recommended daily allowance).  Lifestyle  Aerobic exercise for at least 150 minutes per week (40+ minutes per day, 4-6 days per week). This will help you control your weight and prevent disease.   Do muscle strengthening activities 2 days a week. These help control your weight and prevent disease.  No smoking.  Wear sunscreen to prevent skin cancer.  Have a dental exam and cleaning every 6 months.  Yearly exams  See your health care team every year to talk about:  Any changes in your health.  Any medicines your care team has prescribed.  Preventive care, family planning, and ways to prevent chronic diseases.  Shots (vaccines)   HPV shots (up to age 26), if you've never had them before.  Hepatitis B shots (up to age 59), if you've never had them before.  COVID-19 shot: Get this shot when it's due.  Flu shot: Get a flu shot every year.  Tetanus shot: Get a tetanus shot every 10 years.  Pneumococcal, hepatitis A, and RSV shots: Ask your care team if you need these based on your risk.  Shingles shot (for age 50 and up)  General health tests  Diabetes screening:  Starting at age 35, Get screened for diabetes at least every 3 years.  If you are younger than age 35, ask your care team if you should be screened for diabetes.  Cholesterol test: At age 39, start having a cholesterol test every 5 years, or more often if advised.  Bone density scan (DEXA): At age 50, ask your care team if you should have this scan for osteoporosis (brittle  bones).  Hepatitis C: Get tested at least once in your life.  STIs (sexually transmitted infections)  Before age 24: Ask your care team if you should be screened for STIs.  After age 24: Get screened for STIs if you're at risk. You are at risk for STIs (including HIV) if:  You are sexually active with more than one person.  You don't use condoms every time.  You or a partner was diagnosed with a sexually transmitted infection.  If you are at risk for HIV, ask about PrEP medicine to prevent HIV.  Get tested for HIV at least once in your life, whether you are at risk for HIV or not.  Cancer screening tests  Cervical cancer screening: If you have a cervix, begin getting regular cervical cancer screening tests starting at age 21.  Breast cancer scan (mammogram): If you've ever had breasts, begin having regular mammograms starting at age 40. This is a scan to check for breast cancer.  Colon cancer screening: It is important to start screening for colon cancer at age 45.  Have a colonoscopy test every 10 years (or more often if you're at risk) Or, ask your provider about stool tests like a FIT test every year or Cologuard test every 3 years.  To learn more about your testing options, visit:   .  For help making a decision, visit:   https://bit.ly/jb77342.  Prostate cancer screening test: If you have a prostate, ask your care team if a prostate cancer screening test (PSA) at age 55 is right for you.  Lung cancer screening: If you are a current or former smoker ages 50 to 80, ask your care team if ongoing lung cancer screenings are right for you.  For informational purposes only. Not to replace the advice of your health care provider. Copyright   2023 [employer]. All rights reserved. Clinically reviewed by the [facility]. [employer] 320003 - REV 01/24.

## 2024-07-18 LAB
ELLIPTOCYTES BLD QL SMEAR: SLIGHT
ERYTHROCYTE [DISTWIDTH] IN BLOOD BY AUTOMATED COUNT: 18.2 % (ref 10–15)
HCT VFR BLD AUTO: 44.7 % (ref 40–53)
HGB BLD-MCNC: 13.6 G/DL (ref 13.3–17.7)
MCH RBC QN AUTO: 19.1 PG (ref 26.5–33)
MCHC RBC AUTO-ENTMCNC: 30.4 G/DL (ref 31.5–36.5)
MCV RBC AUTO: 63 FL (ref 78–100)
PLAT MORPH BLD: ABNORMAL
PLATELET # BLD AUTO: 178 10E3/UL (ref 150–450)
POLYCHROMASIA BLD QL SMEAR: SLIGHT
RBC # BLD AUTO: 7.11 10E6/UL (ref 4.4–5.9)
RBC MORPH BLD: ABNORMAL
WBC # BLD AUTO: 6.9 10E3/UL (ref 4–11)

## 2025-08-21 ENCOUNTER — NURSE TRIAGE (OUTPATIENT)
Dept: FAMILY MEDICINE | Facility: CLINIC | Age: 30
End: 2025-08-21
Payer: COMMERCIAL

## 2025-08-21 ENCOUNTER — TELEPHONE (OUTPATIENT)
Dept: INTERNAL MEDICINE | Facility: CLINIC | Age: 30
End: 2025-08-21

## 2025-08-21 ENCOUNTER — OFFICE VISIT (OUTPATIENT)
Dept: PEDIATRICS | Facility: CLINIC | Age: 30
End: 2025-08-21
Payer: COMMERCIAL

## 2025-08-21 VITALS
SYSTOLIC BLOOD PRESSURE: 148 MMHG | RESPIRATION RATE: 18 BRPM | WEIGHT: 200 LBS | HEART RATE: 67 BPM | BODY MASS INDEX: 27.51 KG/M2 | TEMPERATURE: 99 F | OXYGEN SATURATION: 100 % | DIASTOLIC BLOOD PRESSURE: 84 MMHG

## 2025-08-21 DIAGNOSIS — G43.909 MIGRAINE WITHOUT STATUS MIGRAINOSUS, NOT INTRACTABLE, UNSPECIFIED MIGRAINE TYPE: ICD-10-CM

## 2025-08-21 DIAGNOSIS — R03.0 ELEVATED BP WITHOUT DIAGNOSIS OF HYPERTENSION: ICD-10-CM

## 2025-08-21 DIAGNOSIS — H53.8 BLURRY VISION, RIGHT EYE: Primary | ICD-10-CM

## 2025-08-21 DIAGNOSIS — R47.89 GARBLED SPEECH: ICD-10-CM

## 2025-08-21 DIAGNOSIS — R20.2 NUMBNESS AND TINGLING OF RIGHT ARM: ICD-10-CM

## 2025-08-21 DIAGNOSIS — R20.0 NUMBNESS AND TINGLING OF RIGHT ARM: ICD-10-CM

## 2025-08-21 LAB
ALBUMIN SERPL-MCNC: 4.3 G/DL (ref 3.4–5)
ALP SERPL-CCNC: 64 U/L (ref 40–150)
ALT SERPL W P-5'-P-CCNC: 41 U/L (ref 0–70)
ANION GAP SERPL CALCULATED.3IONS-SCNC: 5 MMOL/L (ref 3–14)
AST SERPL W P-5'-P-CCNC: 31 U/L (ref 0–45)
BASO STIPL BLD QL SMEAR: PRESENT
BILIRUB SERPL-MCNC: 1.1 MG/DL (ref 0.2–1.3)
BUN SERPL-MCNC: 18 MG/DL (ref 7–30)
CALCIUM SERPL-MCNC: 9.1 MG/DL (ref 8.5–10.1)
CHLORIDE BLD-SCNC: 107 MMOL/L (ref 94–109)
CO2 SERPL-SCNC: 29 MMOL/L (ref 20–32)
CREAT SERPL-MCNC: 1.2 MG/DL (ref 0.66–1.25)
EGFRCR SERPLBLD CKD-EPI 2021: 84 ML/MIN/1.73M2
ELLIPTOCYTES BLD QL SMEAR: SLIGHT
ERYTHROCYTE [DISTWIDTH] IN BLOOD BY AUTOMATED COUNT: 18.5 % (ref 10–15)
GLUCOSE BLD-MCNC: 105 MG/DL (ref 70–99)
HCT VFR BLD AUTO: 44.5 % (ref 40–53)
HGB BLD-MCNC: 13.6 G/DL (ref 13.3–17.7)
MCH RBC QN AUTO: 19.5 PG (ref 26.5–33)
MCHC RBC AUTO-ENTMCNC: 30.6 G/DL (ref 31.5–36.5)
MCV RBC AUTO: 63.8 FL (ref 78–100)
PLAT MORPH BLD: ABNORMAL
PLATELET # BLD AUTO: 222 10E3/UL (ref 150–450)
POLYCHROMASIA BLD QL SMEAR: SLIGHT
POTASSIUM BLD-SCNC: 4.5 MMOL/L (ref 3.4–5.3)
PROT SERPL-MCNC: 7.4 G/DL (ref 6.8–8.8)
RBC # BLD AUTO: 6.98 10E6/UL (ref 4.4–5.9)
RBC MORPH BLD: ABNORMAL
SODIUM SERPL-SCNC: 141 MMOL/L (ref 135–145)
TSH SERPL DL<=0.005 MIU/L-ACNC: 0.75 UIU/ML (ref 0.3–4.2)
WBC # BLD AUTO: 11.04 10E3/UL (ref 4–11)

## 2025-08-21 RX ORDER — SUMATRIPTAN SUCCINATE 25 MG/1
25 TABLET ORAL
Qty: 15 TABLET | Refills: 0 | Status: SHIPPED | OUTPATIENT
Start: 2025-08-21

## 2025-08-28 ENCOUNTER — PATIENT OUTREACH (OUTPATIENT)
Dept: ONCOLOGY | Facility: CLINIC | Age: 30
End: 2025-08-28

## 2025-08-28 ENCOUNTER — OFFICE VISIT (OUTPATIENT)
Dept: URGENT CARE | Facility: URGENT CARE | Age: 30
End: 2025-08-28
Payer: COMMERCIAL

## 2025-08-28 VITALS
OXYGEN SATURATION: 99 % | BODY MASS INDEX: 28.35 KG/M2 | WEIGHT: 198 LBS | SYSTOLIC BLOOD PRESSURE: 158 MMHG | TEMPERATURE: 98.4 F | HEART RATE: 75 BPM | DIASTOLIC BLOOD PRESSURE: 88 MMHG | HEIGHT: 70 IN | RESPIRATION RATE: 18 BRPM

## 2025-08-28 DIAGNOSIS — R79.9 ABNORMAL BLOOD SMEAR: ICD-10-CM

## 2025-08-28 DIAGNOSIS — R11.0 NAUSEA: ICD-10-CM

## 2025-08-28 DIAGNOSIS — R42 LIGHTHEADEDNESS: Primary | ICD-10-CM

## 2025-08-28 RX ORDER — ONDANSETRON 4 MG/1
4 TABLET, ORALLY DISINTEGRATING ORAL EVERY 8 HOURS PRN
Qty: 30 TABLET | Refills: 0 | Status: SHIPPED | OUTPATIENT
Start: 2025-08-28

## 2025-08-28 RX ORDER — MECLIZINE HYDROCHLORIDE 25 MG/1
25 TABLET ORAL 3 TIMES DAILY PRN
Qty: 30 TABLET | Refills: 0 | Status: SHIPPED | OUTPATIENT
Start: 2025-08-28

## 2025-08-28 ASSESSMENT — ENCOUNTER SYMPTOMS
DIARRHEA: 1
FLANK PAIN: 0
FEVER: 0
FREQUENCY: 0
ABDOMINAL PAIN: 0
DYSURIA: 0
VOMITING: 0
CARDIOVASCULAR NEGATIVE: 1
NAUSEA: 1
BLOOD IN STOOL: 0
COLOR CHANGE: 0
CONSTIPATION: 0
FATIGUE: 0
CHILLS: 1
HEMATURIA: 0
PALPITATIONS: 0
WOUND: 0

## 2025-08-28 ASSESSMENT — PAIN SCALES - GENERAL: PAINLEVEL_OUTOF10: SEVERE PAIN (7)

## 2025-09-04 ENCOUNTER — OFFICE VISIT (OUTPATIENT)
Dept: FAMILY MEDICINE | Facility: CLINIC | Age: 30
End: 2025-09-04
Attending: FAMILY MEDICINE
Payer: COMMERCIAL

## 2025-09-04 VITALS
HEART RATE: 78 BPM | SYSTOLIC BLOOD PRESSURE: 138 MMHG | DIASTOLIC BLOOD PRESSURE: 76 MMHG | BODY MASS INDEX: 28.15 KG/M2 | TEMPERATURE: 98.1 F | RESPIRATION RATE: 16 BRPM | HEIGHT: 70 IN | WEIGHT: 196.6 LBS | OXYGEN SATURATION: 100 %

## 2025-09-04 DIAGNOSIS — R20.0 NUMBNESS AND TINGLING OF RIGHT ARM: ICD-10-CM

## 2025-09-04 DIAGNOSIS — R20.2 NUMBNESS AND TINGLING OF RIGHT ARM: ICD-10-CM

## 2025-09-04 DIAGNOSIS — R47.89 GARBLED SPEECH: ICD-10-CM

## 2025-09-04 DIAGNOSIS — R71.8 ABNORMAL RBC INDICES: ICD-10-CM

## 2025-09-04 DIAGNOSIS — I10 ESSENTIAL HYPERTENSION WITH GOAL BLOOD PRESSURE LESS THAN 140/90: ICD-10-CM

## 2025-09-04 DIAGNOSIS — Z28.21 VACCINATION NOT CARRIED OUT BECAUSE OF PATIENT REFUSAL: ICD-10-CM

## 2025-09-04 DIAGNOSIS — Z00.00 ROUTINE GENERAL MEDICAL EXAMINATION AT A HEALTH CARE FACILITY: Primary | ICD-10-CM

## 2025-09-04 DIAGNOSIS — Z23 NEED FOR VACCINATION: ICD-10-CM

## 2025-09-04 DIAGNOSIS — H53.8 BLURRY VISION, RIGHT EYE: ICD-10-CM

## 2025-09-04 LAB
RETICS # AUTO: 0.14 10E6/UL
RETICS/RBC NFR AUTO: 1.93 %

## 2025-09-04 RX ORDER — AMLODIPINE BESYLATE 5 MG/1
5 TABLET ORAL DAILY
Qty: 90 TABLET | Refills: 1 | Status: SHIPPED | OUTPATIENT
Start: 2025-09-04

## 2025-09-04 SDOH — HEALTH STABILITY: PHYSICAL HEALTH: ON AVERAGE, HOW MANY DAYS PER WEEK DO YOU ENGAGE IN MODERATE TO STRENUOUS EXERCISE (LIKE A BRISK WALK)?: 4 DAYS

## 2025-09-04 ASSESSMENT — PAIN SCALES - GENERAL: PAINLEVEL_OUTOF10: NO PAIN (0)
